# Patient Record
Sex: MALE | Race: WHITE | Employment: UNEMPLOYED | URBAN - METROPOLITAN AREA
[De-identification: names, ages, dates, MRNs, and addresses within clinical notes are randomized per-mention and may not be internally consistent; named-entity substitution may affect disease eponyms.]

---
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
## 2021-02-04 ENCOUNTER — HOSPITAL ENCOUNTER (EMERGENCY)
Facility: CLINIC | Age: 21
Discharge: HOME | End: 2021-02-04
Attending: EMERGENCY MEDICINE | Admitting: EMERGENCY MEDICINE
Payer: COMMERCIAL

## 2021-02-04 VITALS
SYSTOLIC BLOOD PRESSURE: 118 MMHG | HEART RATE: 89 BPM | BODY MASS INDEX: 24.44 KG/M2 | HEIGHT: 69 IN | WEIGHT: 165 LBS | DIASTOLIC BLOOD PRESSURE: 69 MMHG | OXYGEN SATURATION: 98 % | RESPIRATION RATE: 16 BRPM | TEMPERATURE: 96.6 F

## 2021-02-04 DIAGNOSIS — S61.411A LACERATION OF RIGHT HAND WITHOUT FOREIGN BODY, INITIAL ENCOUNTER: ICD-10-CM

## 2021-02-04 PROCEDURE — 99283 EMERGENCY DEPT VISIT LOW MDM: CPT | Mod: 25

## 2021-02-04 PROCEDURE — 250N000011 HC RX IP 250 OP 636: Performed by: EMERGENCY MEDICINE

## 2021-02-04 PROCEDURE — 90715 TDAP VACCINE 7 YRS/> IM: CPT | Performed by: EMERGENCY MEDICINE

## 2021-02-04 PROCEDURE — 90471 IMMUNIZATION ADMIN: CPT | Performed by: EMERGENCY MEDICINE

## 2021-02-04 RX ADMIN — CLOSTRIDIUM TETANI TOXOID ANTIGEN (FORMALDEHYDE INACTIVATED), CORYNEBACTERIUM DIPHTHERIAE TOXOID ANTIGEN (FORMALDEHYDE INACTIVATED), BORDETELLA PERTUSSIS TOXOID ANTIGEN (GLUTARALDEHYDE INACTIVATED), BORDETELLA PERTUSSIS FILAMENTOUS HEMAGGLUTININ ANTIGEN (FORMALDEHYDE INACTIVATED), BORDETELLA PERTUSSIS PERTACTIN ANTIGEN, AND BORDETELLA PERTUSSIS FIMBRIAE 2/3 ANTIGEN 0.5 ML: 5; 2; 2.5; 5; 3; 5 INJECTION, SUSPENSION INTRAMUSCULAR at 22:15

## 2021-02-04 ASSESSMENT — MIFFLIN-ST. JEOR: SCORE: 1753.82

## 2021-02-04 ASSESSMENT — ENCOUNTER SYMPTOMS: WOUND: 1

## 2021-02-05 NOTE — ED PROVIDER NOTES
"  History   Chief Complaint:  Left hand laceration       The history is provided by the patient.      Emiliano Lopez is a 20 year old male who presents with right hand laceration. Last night, patient punched a glass candle with his right hand. Last Tdap 08/14/2020. Laceration is to the dorsal aspect of his ulnar side of his right hand. He denies any numbness or tingling, no loss of function. Pain is constant, nonradiating, mild. He washed it out after it happened yesterday.      Review of Systems   Skin: Positive for wound.   All other systems reviewed and are negative.      Allergies:  The patient has no known allergies.     Medications:  The patient is currently on no regular medications.    Past Medical History:    The patient denies any significant past medical history.     Social History:  Patient presents alone.   Last Tdap 8/14/2020.     Physical Exam     Patient Vitals for the past 24 hrs:   BP Temp Temp src Pulse Resp SpO2 Height Weight   02/04/21 2032 118/69 96.6  F (35.9  C) Oral 89 16 98 % 1.753 m (5' 9\") 74.8 kg (165 lb)       Physical Exam  Vitals: reviewed by me  General: Pt seen on [facility], pleasant, cooperative, and alert to conversation  Eyes: Tracking well, clear conjunctiva BL  Resp:  No tachypnea, no accessory muscle use.   MSK: no obvious peripheral edema or joint effusion.    Right upper extremity sensation intact light touch to first dorsal webspace, index finger and pinky finger. Can make the A-OK, thumbs up, and cross digits two and three. Full range of motion to wrist. Compartments are soft, distal extremity warm and well perfused in all fingers. Does have a 2 cm laceration just proximal to the knuckle on his right pinky finger, MCP joint. The wound is superficial, does not violate to muscle or tendon. It is granulating already.  Skin: No rash, normal turgor and temperature  Neuro: Clear speech and no facial droop.      Emergency Department Course     Emergency Department " Course:    Reviewed:  I reviewed nursing notes, vitals, past medical history and care everywhere    Assessments:  2140 I obtained history and examined the patient as noted above.    I explained findings    Interventions:  2215 Adacel 0.5 mL IM    Disposition:  The patient was discharged to home.       Impression & Plan     Medical Decision Making:  Emiliano Lopez is a 20 year old male who presents to the ER with a laceration to his right hand. He is neurovascularly intact, no tendons damaged, and the distal extremity is warm and well perfused. As this is an old laceration that is already granulating, I did not suture it given the inherent risk of infection. I did place steri strips for gross approximation on the lac after it was soaked in warm soapy water in the ER. He was discharged home with very clear return to ED precautions. No evidence of foreign body, no bony tenderness. No indication for x-ray. Wound is well visualized. The patient understands in detail red flags for return for any signs of infection.     Diagnosis:    ICD-10-CM    1. Laceration of right hand without foreign body, initial encounter  S61.411A        Scribe Disclosure:  I, Liana Alfaro, am serving as a scribe at 9:40 PM on 2/4/2021 to document services personally performed by Abhijit Keith MD based on my observations and the provider's statements to me.     INora, am serving as a scribe at 2:58 AM on 2/5/2021 to document services personally performed by Abhijit Keith MD based on my observations and the provider's statements to me.         Abhijit Keith MD  02/06/21 1143

## 2021-02-05 NOTE — DISCHARGE INSTRUCTIONS
Come back to the emergency room immediately if you have any redness around that wound.  We were unable to stitch it since it is a laceration you suffered yesterday, and the risk for infection is actually higher up with the stitches in.  Please come back to the emergency room immediately with any other concerns, but do be sure to have someone follow-up on your laceration within the next 3 days routine wound check.